# Patient Record
Sex: MALE | Race: WHITE | NOT HISPANIC OR LATINO | URBAN - METROPOLITAN AREA
[De-identification: names, ages, dates, MRNs, and addresses within clinical notes are randomized per-mention and may not be internally consistent; named-entity substitution may affect disease eponyms.]

---

## 2018-04-11 ENCOUNTER — OFFICE VISIT - HEALTHEAST (OUTPATIENT)
Dept: FAMILY MEDICINE | Facility: CLINIC | Age: 43
End: 2018-04-11

## 2018-04-11 DIAGNOSIS — E78.5 HYPERLIPIDEMIA: ICD-10-CM

## 2018-04-11 DIAGNOSIS — F41.9 ANXIETY: ICD-10-CM

## 2018-04-11 DIAGNOSIS — Z13.1 SCREENING FOR DIABETES MELLITUS: ICD-10-CM

## 2018-04-11 DIAGNOSIS — R06.83 SNORING: ICD-10-CM

## 2018-04-11 DIAGNOSIS — Z86.2 HISTORY OF ANEMIA: ICD-10-CM

## 2018-04-11 DIAGNOSIS — F17.200 TOBACCO USE DISORDER: ICD-10-CM

## 2018-04-11 DIAGNOSIS — F41.1 ANXIETY STATE: ICD-10-CM

## 2018-04-11 LAB
ALBUMIN SERPL-MCNC: 4 G/DL (ref 3.5–5)
ALP SERPL-CCNC: 63 U/L (ref 45–120)
ALT SERPL W P-5'-P-CCNC: 14 U/L (ref 0–45)
ANION GAP SERPL CALCULATED.3IONS-SCNC: 9 MMOL/L (ref 5–18)
AST SERPL W P-5'-P-CCNC: 14 U/L (ref 0–40)
BASOPHILS # BLD AUTO: 0 THOU/UL (ref 0–0.2)
BASOPHILS NFR BLD AUTO: 1 % (ref 0–2)
BILIRUB SERPL-MCNC: 0.8 MG/DL (ref 0–1)
BUN SERPL-MCNC: 13 MG/DL (ref 8–22)
CALCIUM SERPL-MCNC: 9.9 MG/DL (ref 8.5–10.5)
CHLORIDE BLD-SCNC: 98 MMOL/L (ref 98–107)
CHOLEST SERPL-MCNC: 241 MG/DL
CO2 SERPL-SCNC: 29 MMOL/L (ref 22–31)
CREAT SERPL-MCNC: 0.98 MG/DL (ref 0.7–1.3)
EOSINOPHIL # BLD AUTO: 0.2 THOU/UL (ref 0–0.4)
EOSINOPHIL NFR BLD AUTO: 3 % (ref 0–6)
ERYTHROCYTE [DISTWIDTH] IN BLOOD BY AUTOMATED COUNT: 11.6 % (ref 11–14.5)
FASTING STATUS PATIENT QL REPORTED: NO
GFR SERPL CREATININE-BSD FRML MDRD: >60 ML/MIN/1.73M2
GLUCOSE BLD-MCNC: 89 MG/DL (ref 70–125)
HCT VFR BLD AUTO: 49.9 % (ref 40–54)
HDLC SERPL-MCNC: 52 MG/DL
HGB BLD-MCNC: 16.9 G/DL (ref 14–18)
LDLC SERPL CALC-MCNC: 150 MG/DL
LYMPHOCYTES # BLD AUTO: 1.5 THOU/UL (ref 0.8–4.4)
LYMPHOCYTES NFR BLD AUTO: 28 % (ref 20–40)
MCH RBC QN AUTO: 34.3 PG (ref 27–34)
MCHC RBC AUTO-ENTMCNC: 33.8 G/DL (ref 32–36)
MCV RBC AUTO: 101 FL (ref 80–100)
MONOCYTES # BLD AUTO: 0.3 THOU/UL (ref 0–0.9)
MONOCYTES NFR BLD AUTO: 6 % (ref 2–10)
NEUTROPHILS # BLD AUTO: 3.3 THOU/UL (ref 2–7.7)
NEUTROPHILS NFR BLD AUTO: 63 % (ref 50–70)
PLATELET # BLD AUTO: 165 THOU/UL (ref 140–440)
PMV BLD AUTO: 7.6 FL (ref 7–10)
POTASSIUM BLD-SCNC: 4.4 MMOL/L (ref 3.5–5)
PROT SERPL-MCNC: 7.3 G/DL (ref 6–8)
RBC # BLD AUTO: 4.93 MILL/UL (ref 4.4–6.2)
SODIUM SERPL-SCNC: 136 MMOL/L (ref 136–145)
TRIGL SERPL-MCNC: 196 MG/DL
WBC: 5.3 THOU/UL (ref 4–11)

## 2018-04-12 ENCOUNTER — OFFICE VISIT - HEALTHEAST (OUTPATIENT)
Dept: SLEEP MEDICINE | Facility: CLINIC | Age: 43
End: 2018-04-12

## 2018-04-12 DIAGNOSIS — R29.818 SUSPECTED SLEEP APNEA: ICD-10-CM

## 2018-04-12 DIAGNOSIS — R06.83 SNORING: ICD-10-CM

## 2018-04-12 DIAGNOSIS — G47.10 HYPERSOMNIA: ICD-10-CM

## 2018-04-12 DIAGNOSIS — G47.8 SLEEP DYSFUNCTION WITH SLEEP STAGE DISTURBANCE: ICD-10-CM

## 2018-04-12 ASSESSMENT — MIFFLIN-ST. JEOR: SCORE: 1520.84

## 2018-04-13 ENCOUNTER — COMMUNICATION - HEALTHEAST (OUTPATIENT)
Dept: FAMILY MEDICINE | Facility: CLINIC | Age: 43
End: 2018-04-13

## 2018-05-15 ENCOUNTER — RECORDS - HEALTHEAST (OUTPATIENT)
Dept: SLEEP MEDICINE | Facility: CLINIC | Age: 43
End: 2018-05-15

## 2018-05-15 ENCOUNTER — RECORDS - HEALTHEAST (OUTPATIENT)
Dept: ADMINISTRATIVE | Facility: OTHER | Age: 43
End: 2018-05-15

## 2018-05-15 DIAGNOSIS — R06.83 SNORING: ICD-10-CM

## 2018-05-15 DIAGNOSIS — G47.8 OTHER SLEEP DISORDERS: ICD-10-CM

## 2018-05-15 DIAGNOSIS — G47.10 HYPERSOMNIA, UNSPECIFIED: ICD-10-CM

## 2018-05-15 DIAGNOSIS — G47.30 SLEEP APNEA, UNSPECIFIED: ICD-10-CM

## 2018-05-17 ENCOUNTER — COMMUNICATION - HEALTHEAST (OUTPATIENT)
Dept: SLEEP MEDICINE | Facility: CLINIC | Age: 43
End: 2018-05-17

## 2018-05-17 DIAGNOSIS — G47.33 OBSTRUCTIVE SLEEP APNEA: ICD-10-CM

## 2018-05-18 ENCOUNTER — AMBULATORY - HEALTHEAST (OUTPATIENT)
Dept: SLEEP MEDICINE | Facility: CLINIC | Age: 43
End: 2018-05-18

## 2018-05-21 ENCOUNTER — COMMUNICATION - HEALTHEAST (OUTPATIENT)
Dept: SLEEP MEDICINE | Facility: CLINIC | Age: 43
End: 2018-05-21

## 2018-06-01 ENCOUNTER — AMBULATORY - HEALTHEAST (OUTPATIENT)
Dept: SLEEP MEDICINE | Facility: CLINIC | Age: 43
End: 2018-06-01

## 2018-06-25 ENCOUNTER — COMMUNICATION - HEALTHEAST (OUTPATIENT)
Dept: SCHEDULING | Facility: CLINIC | Age: 43
End: 2018-06-25

## 2018-06-26 ENCOUNTER — OFFICE VISIT - HEALTHEAST (OUTPATIENT)
Dept: FAMILY MEDICINE | Facility: CLINIC | Age: 43
End: 2018-06-26

## 2018-06-26 DIAGNOSIS — L03.90 CELLULITIS: ICD-10-CM

## 2018-06-26 DIAGNOSIS — T63.461A WASP STING: ICD-10-CM

## 2018-06-26 ASSESSMENT — MIFFLIN-ST. JEOR: SCORE: 1514.49

## 2019-12-05 ENCOUNTER — RECORDS - HEALTHEAST (OUTPATIENT)
Dept: ADMINISTRATIVE | Facility: OTHER | Age: 44
End: 2019-12-05

## 2019-12-06 ENCOUNTER — OFFICE VISIT - HEALTHEAST (OUTPATIENT)
Dept: FAMILY MEDICINE | Facility: CLINIC | Age: 44
End: 2019-12-06

## 2019-12-06 DIAGNOSIS — Z23 NEED FOR HEPATITIS A VACCINATION: ICD-10-CM

## 2019-12-06 DIAGNOSIS — R10.9 RIGHT FLANK PAIN: ICD-10-CM

## 2019-12-06 DIAGNOSIS — R35.0 URINE FREQUENCY: ICD-10-CM

## 2019-12-06 DIAGNOSIS — F41.9 ANXIETY: ICD-10-CM

## 2019-12-06 LAB
ALBUMIN UR-MCNC: NEGATIVE MG/DL
APPEARANCE UR: CLEAR
BILIRUB UR QL STRIP: NEGATIVE
COLOR UR AUTO: YELLOW
GLUCOSE UR STRIP-MCNC: NEGATIVE MG/DL
HGB UR QL STRIP: NEGATIVE
KETONES UR STRIP-MCNC: NEGATIVE MG/DL
LEUKOCYTE ESTERASE UR QL STRIP: NEGATIVE
NITRATE UR QL: NEGATIVE
PH UR STRIP: 6 [PH] (ref 5–8)
SP GR UR STRIP: 1.03 (ref 1–1.03)
UROBILINOGEN UR STRIP-ACNC: NORMAL

## 2019-12-06 ASSESSMENT — ANXIETY QUESTIONNAIRES
6. BECOMING EASILY ANNOYED OR IRRITABLE: NOT AT ALL
1. FEELING NERVOUS, ANXIOUS, OR ON EDGE: NEARLY EVERY DAY
IF YOU CHECKED OFF ANY PROBLEMS ON THIS QUESTIONNAIRE, HOW DIFFICULT HAVE THESE PROBLEMS MADE IT FOR YOU TO DO YOUR WORK, TAKE CARE OF THINGS AT HOME, OR GET ALONG WITH OTHER PEOPLE: SOMEWHAT DIFFICULT
GAD7 TOTAL SCORE: 13
2. NOT BEING ABLE TO STOP OR CONTROL WORRYING: NEARLY EVERY DAY
5. BEING SO RESTLESS THAT IT IS HARD TO SIT STILL: SEVERAL DAYS
7. FEELING AFRAID AS IF SOMETHING AWFUL MIGHT HAPPEN: SEVERAL DAYS
4. TROUBLE RELAXING: MORE THAN HALF THE DAYS
3. WORRYING TOO MUCH ABOUT DIFFERENT THINGS: NEARLY EVERY DAY

## 2019-12-06 ASSESSMENT — PATIENT HEALTH QUESTIONNAIRE - PHQ9: SUM OF ALL RESPONSES TO PHQ QUESTIONS 1-9: 5

## 2019-12-06 ASSESSMENT — MIFFLIN-ST. JEOR: SCORE: 1537.74

## 2019-12-07 LAB — BACTERIA SPEC CULT: NO GROWTH

## 2019-12-10 ENCOUNTER — COMMUNICATION - HEALTHEAST (OUTPATIENT)
Dept: FAMILY MEDICINE | Facility: CLINIC | Age: 44
End: 2019-12-10

## 2020-06-18 ENCOUNTER — COMMUNICATION - HEALTHEAST (OUTPATIENT)
Dept: FAMILY MEDICINE | Facility: CLINIC | Age: 45
End: 2020-06-18

## 2020-06-18 DIAGNOSIS — F41.9 ANXIETY: ICD-10-CM

## 2020-06-19 ENCOUNTER — OFFICE VISIT - HEALTHEAST (OUTPATIENT)
Dept: FAMILY MEDICINE | Facility: CLINIC | Age: 45
End: 2020-06-19

## 2020-06-19 DIAGNOSIS — F41.9 ANXIETY: ICD-10-CM

## 2020-06-19 DIAGNOSIS — F17.200 TOBACCO USE DISORDER: ICD-10-CM

## 2020-06-19 RX ORDER — BUPROPION HYDROCHLORIDE 150 MG/1
150 TABLET ORAL DAILY
Qty: 90 TABLET | Refills: 0 | Status: SHIPPED | OUTPATIENT
Start: 2020-06-19

## 2020-06-19 ASSESSMENT — ANXIETY QUESTIONNAIRES
3. WORRYING TOO MUCH ABOUT DIFFERENT THINGS: MORE THAN HALF THE DAYS
7. FEELING AFRAID AS IF SOMETHING AWFUL MIGHT HAPPEN: SEVERAL DAYS
GAD7 TOTAL SCORE: 10
4. TROUBLE RELAXING: MORE THAN HALF THE DAYS
IF YOU CHECKED OFF ANY PROBLEMS ON THIS QUESTIONNAIRE, HOW DIFFICULT HAVE THESE PROBLEMS MADE IT FOR YOU TO DO YOUR WORK, TAKE CARE OF THINGS AT HOME, OR GET ALONG WITH OTHER PEOPLE: NOT DIFFICULT AT ALL
5. BEING SO RESTLESS THAT IT IS HARD TO SIT STILL: NOT AT ALL
2. NOT BEING ABLE TO STOP OR CONTROL WORRYING: MORE THAN HALF THE DAYS
6. BECOMING EASILY ANNOYED OR IRRITABLE: MORE THAN HALF THE DAYS
1. FEELING NERVOUS, ANXIOUS, OR ON EDGE: SEVERAL DAYS

## 2020-06-19 ASSESSMENT — PATIENT HEALTH QUESTIONNAIRE - PHQ9: SUM OF ALL RESPONSES TO PHQ QUESTIONS 1-9: 6

## 2021-01-22 ENCOUNTER — COMMUNICATION - HEALTHEAST (OUTPATIENT)
Dept: FAMILY MEDICINE | Facility: CLINIC | Age: 46
End: 2021-01-22

## 2021-01-22 DIAGNOSIS — F41.9 ANXIETY: ICD-10-CM

## 2021-05-26 ASSESSMENT — PATIENT HEALTH QUESTIONNAIRE - PHQ9: SUM OF ALL RESPONSES TO PHQ QUESTIONS 1-9: 5

## 2021-05-27 ASSESSMENT — PATIENT HEALTH QUESTIONNAIRE - PHQ9: SUM OF ALL RESPONSES TO PHQ QUESTIONS 1-9: 6

## 2021-05-28 ENCOUNTER — RECORDS - HEALTHEAST (OUTPATIENT)
Dept: ADMINISTRATIVE | Facility: CLINIC | Age: 46
End: 2021-05-28

## 2021-05-28 ASSESSMENT — ANXIETY QUESTIONNAIRES
GAD7 TOTAL SCORE: 10
GAD7 TOTAL SCORE: 13

## 2021-06-01 VITALS — HEIGHT: 67 IN | WEIGHT: 150.4 LBS | BODY MASS INDEX: 23.61 KG/M2

## 2021-06-01 VITALS — BODY MASS INDEX: 23.39 KG/M2 | WEIGHT: 149 LBS | HEIGHT: 67 IN

## 2021-06-01 VITALS — BODY MASS INDEX: 23.36 KG/M2 | WEIGHT: 149.13 LBS

## 2021-06-02 ENCOUNTER — RECORDS - HEALTHEAST (OUTPATIENT)
Dept: ADMINISTRATIVE | Facility: CLINIC | Age: 46
End: 2021-06-02

## 2021-06-04 VITALS
HEIGHT: 67 IN | WEIGHT: 155 LBS | OXYGEN SATURATION: 99 % | HEART RATE: 94 BPM | BODY MASS INDEX: 24.33 KG/M2 | DIASTOLIC BLOOD PRESSURE: 86 MMHG | SYSTOLIC BLOOD PRESSURE: 130 MMHG

## 2021-06-04 NOTE — PROGRESS NOTES
Chart reviewed.  Pt is scheduled for a care manager telephone visit today 10/4/17 at 9:30 AM, however is currently admitted to [facility].  Placed phone call to Shruthi in scheduling to request that care manager telephone visit and discharge clinic appt be canceled and/or rescheduled.   Chief Complaint   Patient presents with     Flank Pain     Right lower side. Comes and goes for a couple of years. Feels slight pain everyday. Pain starting to be a little more stronger.      Urinary Frequency     For the last couple of days.      Bladder Pressure     Travel Consult     Will be leaving to [facility] in January.        HPI: Patient presents today with multiple concerns.    First for the last year he has noticed a consistent feeling of something sight and uncomfortable along his right flank when he pushes into it. Previous assessment has not turned up anything abnormal. CT scan previously performed for pelvic pains years ago was normal as well.    Patient complains as well about urinary frequency where he feels like he needs to urinate every 2 hours. There's maybe a small amount of pain with it, but not sure. No blood. No discharge. No STD risk. Afebrile. No hx of stone.     Patient complains of anxiety. In the past he had used wellbutrin for this, but never felt like it helped a ton. No SI or HI. Sleep is adequate. No large amounts of caffeine.    Patient is going to [facility] in January. He was evaluated at a travel clinic last year and reports getting his first hep a, but not his second. He also received a typhoid vaccine as well last year.    He continues to smoke about a 1/2 ppd.    CHERYL 7 Total Score: 13 (12/6/2019  4:00 PM)  PHQ-9 Total Score: 5 (12/6/2019  4:00 PM)    ROS:Review of Systems - negative except for what's listed in the HPI    SH: The Patient's  reports that he has been smoking cigarettes. He has been smoking about 0.50 packs per day. He has never used smokeless tobacco. He reports current alcohol use of about 8.0 standard drinks of alcohol per week. He reports that he does not use drugs.      FH: The Patient's family history includes COPD in his mother; Depression in his mother; Heart attack in his father; No Medical Problems in his daughter; Snoring in his father.     Meds:    Current  "Outpatient Medications on File Prior to Visit   Medication Sig Dispense Refill     buPROPion (WELLBUTRIN XL) 150 MG 24 hr tablet Take 1 tablet (150 mg total) by mouth every morning. 30 tablet 11     No current facility-administered medications on file prior to visit.        O:  /86   Pulse 94   Ht 5' 6.75\" (1.695 m)   Wt 155 lb (70.3 kg)   SpO2 99%   BMI 24.46 kg/m      Physical Examination:   General appearance - alert, well appearing, and in no distress  Mental status - alert, oriented to person, place, and time. Appropriately dress. Speech at times mildly pressured. Thought process sometimes scattered. Appears very anxious about this sensation in his flank.  Mouth - mucous membranes moist, pharynx normal  Neck - no significant adenopathy  Lymphatics - no palpable lymphadenopathy, no hepatosplenomegaly  Chest - clear to auscultation, no wheezes, rales or rhonchi, symmetric air entry  Heart - normal rate and regular rhythm, S1 and S2 normal, no murmurs noted  Abdomen - soft, nondistended, no masses or organomegaly  Skin - normal coloration and turgor.      A/P:     Problem List Items Addressed This Visit     None      Visit Diagnoses     Urine frequency    -  Primary    Relevant Orders    Urinalysis Macroscopic (Completed)    Culture, Urine (Completed)    Right flank pain        Relevant Orders    CT Abdomen Pelvis With Oral With IV Contrast    Anxiety        Relevant Medications    sertraline (ZOLOFT) 50 MG tablet    Need for hepatitis A vaccination        Relevant Orders    Hepatitis A adult 2 dose IM (19 yr & older) (Completed)            1. Urine frequency  Urinalysis normal. Patient admits later that his symptoms are a little better today than yesterday. Culture to confirm. Monitor    - Urinalysis Macroscopic  - Culture, Urine    2. Right flank pain  Unknown cause. No abnormality palpated. Previous labs have been fine. Possible tugging on the fascia vs bowel spasm. R/o stone w/ CT    - CT Abdomen " Pelvis With Oral With IV Contrast; Future    3. Anxiety  Uncontrolled. Recommended counseling and/or medication. He would like to try medication. Discussed how wellbutrin can at times exacerbate anxiety and recommended SSRI which he was agreeable to.     - sertraline (ZOLOFT) 50 MG tablet; 1/2 tablet daily for a week then a full tab daily.  Dispense: 30 tablet; Refill: 2    4. Need for hepatitis A vaccination  No records of second hep a from [facility]. Last dose given today. Should be ok on typhoid if he received it last year.  - Hepatitis A adult 2 dose IM (19 yr & older)        Leodan Hyde, CNP

## 2021-06-09 NOTE — TELEPHONE ENCOUNTER
Sertraline.    Left message to call back for: patient  Information to relay to patient:  See message below and help schedule if appropriate

## 2021-06-09 NOTE — TELEPHONE ENCOUNTER
Last Med Check: 12/6/19.    Next med check due on: ?    Future Appointment Scheduled ? No.     Last Med Refill? 12/6/9.    Elise oL, CMA

## 2021-06-09 NOTE — PROGRESS NOTES
"Lalitha Parmar is a 44 y.o. male who is being evaluated via a billable telephone visit.      The patient has been notified of following:     \"This telephone visit will be conducted via a call between you and your physician/provider. We have found that certain health care needs can be provided without the need for a physical exam.  This service lets us provide the care you need with a short phone conversation.  If a prescription is necessary we can send it directly to your pharmacy.  If lab work is needed we can place an order for that and you can then stop by our lab to have the test done at a later time.    Telephone visits are billed at different rates depending on your insurance coverage. During this emergency period, for some insurers they may be billed the same as an in-person visit.  Please reach out to your insurance provider with any questions.    If during the course of the call the physician/provider feels a telephone visit is not appropriate, you will not be charged for this service.\"    Patient has given verbal consent to a Telephone visit? Yes    What phone number would you like to be contacted at? [phone].    Patient would like to receive their AVS by Declined.     Additional provider notes: Patient presents today for medication check on sertraline which we started back in December 2019 for worsening anxiety symptoms.  No major side effects.  The patient admits that he does not take the medication every day but rather will start and stop it after taking it for a few weeks at a time.  Unfortunately anxiety symptoms come back when he stops the medication.    CHERYL 7 Total Score: 10 (6/19/2020  4:00 PM)  PHQ-9 Total Score: 6 (6/19/2020  4:00 PM)    Unfortunately he continues to smoke.  In the past he found that Wellbutrin was beneficial for curbing his smoking desire.  He thinks he is getting at a point where he is ready to quit.    Assessment/Plan:    Refill of sertraline sent to the pharmacy.  " Advised the patient that this medication would probably be best taken every day so that he is not on a roller coaster of anxiety symptoms.  Okay to restart bupropion with this medication.  Discussed that there is a potential for increased anxiety symptoms.  Alternatives include nicotine replacement and Chantix.  If everything is going well, check back with me in 6 months for physical.    1. Anxiety  - sertraline (ZOLOFT) 50 MG tablet; Take 1 tablet (50 mg total) by mouth daily.  Dispense: 90 tablet; Refill: 1  - buPROPion (WELLBUTRIN XL) 150 MG 24 hr tablet; Take 1 tablet (150 mg total) by mouth daily.  Dispense: 90 tablet; Refill: 0    2. Nicotine Dependence  - buPROPion (WELLBUTRIN XL) 150 MG 24 hr tablet; Take 1 tablet (150 mg total) by mouth daily.  Dispense: 90 tablet; Refill: 0        Phone call duration:  13 minutes

## 2021-06-09 NOTE — TELEPHONE ENCOUNTER
Patient Returning Call  Reason for call:  Patient called back.  Information relayed to patient:  Informed of message listed below from Leodan Hyde CNP.  Patient states understanding and agrees with plan.  Patient has additional questions:  No  If YES, what are your questions/concerns:    Okay to leave a detailed message?: No call back needed

## 2021-06-14 NOTE — TELEPHONE ENCOUNTER
Refill Approved    Rx renewed per Medication Renewal Policy. Medication was last renewed on 6/19/20, last OV 6/19/20.    Cata Dennis, [facility] Triage/Med Refill 1/24/2021     Requested Prescriptions   Pending Prescriptions Disp Refills     sertraline (ZOLOFT) 50 MG tablet [Pharmacy Med Name: SERTRALINE 50MG TABLETS] 90 tablet 1     Sig: TAKE 1 TABLET BY MOUTH DAILY       SSRI Refill Protocol  Passed - 1/22/2021  5:16 PM        Passed - PCP or prescribing provider visit in last year     Last office visit with prescriber/PCP: 12/6/2019 Leodan Hyde CNP OR same dept: Visit date not found OR same specialty: 12/6/2019 Leodan Hyde CNP  Last physical: Visit date not found Last MTM visit: Visit date not found   Next visit within 3 mo: Visit date not found  Next physical within 3 mo: Visit date not found  Prescriber OR PCP: Leodan Hyde CNP  Last diagnosis associated with med order: 1. Anxiety  - sertraline (ZOLOFT) 50 MG tablet [Pharmacy Med Name: SERTRALINE 50MG TABLETS]; TAKE 1 TABLET BY MOUTH DAILY  Dispense: 90 tablet; Refill: 1    If protocol passes may refill for 12 months if within 3 months of last provider visit (or a total of 15 months).

## 2021-06-17 NOTE — PATIENT INSTRUCTIONS - HE
Patient Instructions by Leodan Hyde CNP at 12/6/2019  3:00 PM     Author: Leodan Hyde CNP Service: -- Author Type: Nurse Practitioner    Filed: 12/6/2019  3:32 PM Encounter Date: 12/6/2019 Status: Addendum    : Leodan Hyde CNP (Nurse Practitioner)    Related Notes: Original Note by Leodan Hyde CNP (Nurse Practitioner) filed at 12/6/2019  3:31 PM       Urine so far looks clean. We're going to culture to confirm.     Let's try something for anxiety. I sent the sertraline to the pharmacy. 1/2 tab daily for a week then a full tab daily.    I ordered the CT IF the pain doesn't get better by the end of the month. If the pain goes away, you don't have to get this done.    I usually comment on labs and imaging after they are all resulted within 2 business days. If you haven't heard your results within a week, please contact the office.    Thank you for coming in today!    If you receive a survey from [employer] about your experience today, it would be very helpful if you could fill it out to let us know what went well and what we can improve!    General Information:    Today you had your appointment with Leodan Hyde NP    My hours are:    Monday : Out of clinic  Tuesday : 8:00AM - 5:00 PM  Wednesday: 8:00AM - 5:00 PM  Thursday: 8:00AM - 5:00 PM  Friday: 8:00AM - 5:00 PM    I am not in the office Mondays. Therefore non-urgent calls and medical messages received on Monday will be addressed when I am back in the office on Tuesday. Urgent matters will be reviewed and addressed by one of my partners in the office as needed.    If lab work was done today as part of your evaluation you will generally be contacted via [employer], mail, or phone with the results within 1-5 days. If there is an alarming result we will contact you by phone. Lab results come back at varying times, I generally wait until all lab results are available before making comments on the results.     If you need refills please contact  your pharmacy. They will send a refill request to me to review. Please allow 3-5 business days for us to process all refill requests.     My Clinical Assistant is Elise. Please call us at [phone] or send a medical message with any questions or concerns.         Patient Education     Sertraline tablets  Brand Name: Zoloft  What is this medicine?  SERTRALINE (SER tra prisca) is used to treat depression. It may also be used to treat obsessive compulsive disorder, panic disorder, post-trauma stress, premenstrual dysphoric disorder (PMDD) or social anxiety.  How should I use this medicine?  Take this medicine by mouth with a glass of water. Follow the directions on the prescription label. You can take it with or without food. Take your medicine at regular intervals. Do not take your medicine more often than directed. Do not stop taking this medicine suddenly except upon the advice of your doctor. Stopping this medicine too quickly may cause serious side effects or your condition may worsen.  A special MedGuide will be given to you by the pharmacist with each prescription and refill. Be sure to read this information carefully each time.  Talk to your pediatrician regarding the use of this medicine in children. While this drug may be prescribed for children as young as 7 years for selected conditions, precautions do apply.  What side effects may I notice from receiving this medicine?  Side effects that you should report to your doctor or health care professional as soon as possible:    allergic reactions like skin rash, itching or hives, swelling of the face, lips, or tongue    anxious    black, tarry stools    changes in vision    confusion    elevated mood, decreased need for sleep, racing thoughts, impulsive behavior    eye pain    fast, irregular heartbeat    feeling faint or lightheaded, falls    feeling agitated, angry, or irritable    hallucination, loss of contact with reality    loss of balance or  coordination    loss of memory    painful or prolonged erections    restlessness, pacing, inability to keep still    seizures    stiff muscles    suicidal thoughts or other mood changes    trouble sleeping    unusual bleeding or bruising    unusually weak or tired    vomiting  Side effects that usually do not require medical attention (report to your doctor or health care professional if they continue or are bothersome):    change in appetite or weight    change in sex drive or performance    diarrhea    increased sweating    indigestion, nausea    tremors  What may interact with this medicine?  Do not take this medicine with any of the following medications:    certain medicines for fungal infections like fluconazole, itraconazole, ketoconazole, posaconazole, voriconazole    cisapride    disulfiram    dofetilide    linezolid    MAOIs like Carbex, Eldepryl, Marplan, Nardil, and Parnate    metronidazole    methylene blue (injected into a vein)    pimozide    thioridazine    ziprasidone  This medicine may also interact with the following medications:    alcohol    amphetamines    aspirin and aspirin-like medicines    certain medicines for depression, anxiety, or psychotic disturbances    certain medicines for irregular heart beat like flecainide, propafenone    certain medicines for migraine headaches like almotriptan, eletriptan, frovatriptan, naratriptan, rizatriptan, sumatriptan, zolmitriptan    certain medicines for sleep    certain medicines for seizures like carbamazepine, valproic acid, phenytoin    certain medicines that treat or prevent blood clots like warfarin, enoxaparin, dalteparin    cimetidine    digoxin    diuretics    fentanyl    furazolidone    isoniazid    lithium    NSAIDs, medicines for pain and inflammation, like ibuprofen or naproxen    other medicines that prolong the QT interval (cause an abnormal heart rhythm)    procarbazine    rasagiline    supplements like Becky's wort, kava kava,  valerian    tolbutamide    tramadol    tryptophan  What if I miss a dose?  If you miss a dose, take it as soon as you can. If it is almost time for your next dose, take only that dose. Do not take double or extra doses.  Where should I keep my medicine?  Keep out of the reach of children.  Store at room temperature between 15 and 30 degrees C (59 and 86 degrees F). Throw away any unused medicine after the expiration date.  What should I tell my health care provider before I take this medicine?  They need to know if you have any of these conditions:    bleeding disorders    bipolar disorder or a family history of bipolar disorder    glaucoma    heart disease    high blood pressure    history of irregular heartbeat    history of low levels of calcium, magnesium, or potassium in the blood    if you often drink alcohol    liver disease    receiving electroconvulsive therapy    seizures    suicidal thoughts, plans, or attempt; a previous suicide attempt by you or a family member    take medicines that treat or prevent blood clots    thyroid disease    an unusual or allergic reaction to sertraline, other medicines, foods, dyes, or preservatives    pregnant or trying to get pregnant    breast-feeding  What should I watch for while using this medicine?  Tell your doctor if your symptoms do not get better or if they get worse. Visit your doctor or health care professional for regular checks on your progress. Because it may take several weeks to see the full effects of this medicine, it is important to continue your treatment as prescribed by your doctor.  Patients and their families should watch out for new or worsening thoughts of suicide or depression. Also watch out for sudden changes in feelings such as feeling anxious, agitated, panicky, irritable, hostile, aggressive, impulsive, severely restless, overly excited and hyperactive, or not being able to sleep. If this happens, especially at the beginning of treatment or  after a change in dose, call your health care professional.  You may get drowsy or dizzy. Do not drive, use machinery, or do anything that needs mental alertness until you know how this medicine affects you. Do not stand or sit up quickly, especially if you are an older patient. This reduces the risk of dizzy or fainting spells. Alcohol may interfere with the effect of this medicine. Avoid alcoholic drinks.  Your mouth may get dry. Chewing sugarless gum or sucking hard candy, and drinking plenty of water may help. Contact your doctor if the problem does not go away or is severe.  NOTE:This sheet is a summary. It may not cover all possible information. If you have questions about this medicine, talk to your doctor, pharmacist, or health care provider. Copyright  2018 [employer]
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
## 2021-06-18 NOTE — PROGRESS NOTES
Assessment/ Plan     1. Cellulitis  I believe patient has developed a cellulitis after his wasp sting. He has redness streaking up his right arm.  I did jona this with sharpie marker.  We will start him on Keflex and have him follow-up if the redness continues to spread.  Would also have him take a Zyrtec during the day and Benadryl at night for the local reaction.  - cephalexin (KEFLEX) 500 MG capsule; Take 1 capsule (500 mg total) by mouth 3 (three) times a day for 10 days.  Dispense: 30 capsule; Refill: 0    2. Wasp sting        Subjective:       Lalitha Parmar is a 42 y.o. male who presents for evaluation after a wasp sting.  Patient states yesterday he was at work when he got stung on his right forearm.  He has pictures that he shows me on his phone.  It was a typical local reaction with some redness and swelling there is a little bit of we will over the top of it.  He states then last night it became more red and swollen.  He had hives across his groin area.  He did take a Benadryl and then decided to come in for check.  He is not having a fever feeling sick.  He had a wasp sting last year and just had a little bit of local swelling.  He denies any lip or tongue swelling, no wheezing.  No allergies to anything else.    Relevant past medical, family, surgical, and social history reviewed with patient, unless noted in HPI, not pertinent for this visit.    Review of Systems   A 12 point comprehensive review of systems was negative except as noted.      Current Outpatient Prescriptions   Medication Sig Dispense Refill     buPROPion (WELLBUTRIN XL) 150 MG 24 hr tablet Take 1 tablet (150 mg total) by mouth every morning. 30 tablet 11     cephalexin (KEFLEX) 500 MG capsule Take 1 capsule (500 mg total) by mouth 3 (three) times a day for 10 days. 30 capsule 0     No current facility-administered medications for this visit.        Objective:      /68  Pulse 72  Temp 99.7  F (37.6  C) (Oral)   Resp 18  Ht  "5' 7\" (1.702 m)  Wt 149 lb (67.6 kg)  BMI 23.34 kg/m2      General appearance: alert, appears stated age and cooperative  Head: Normocephalic, without obvious abnormality, atraumatic  Eyes: conjunctivae/corneas clear.   Ears: normal TM's and external ear canals both ears  Nose: Nares normal. Septum midline. Mucosa normal. No drainage or sinus tenderness.  Throat: lips, mucosa, and tongue normal; teeth and gums normal  Neck: no adenopathy.  Lungs: clear to auscultation bilaterally  Heart: regular rate and rhythm, S1, S2 normal, no murmur, click, rub or gallop  Abdomen: soft, non-tender; bowel sounds normal; no masses,  no organomegaly    Skin: Right forearm has a small scab where he had a wasp sting.  There is significant surrounding erythema, edema, and warmth.  He does have streaking up the medial aspect of his right arm.  I did outline this with a sharpie marker.    No results found for this or any previous visit (from the past 168 hour(s)).       This note has been dictated using voice recognition software. Any grammatical or context distortions are unintentional and inherent to the software  "

## 2021-06-18 NOTE — PROGRESS NOTES
Order for Durable Medical Equipment was processed and equipment ordered.   DME provider: [facility]  Date Faxed: 05/18/2018  Ordering Provider: Dr. Magaña  Equipment ordered: Cpap

## 2021-06-18 NOTE — PROGRESS NOTES
Patient was offered choice of vendor and chose [facility].  Patient Lalitha Parmar was set up at [facility] on 6/1/18. Patient received a Resmed [id number] Auto. Pressures were set at 5-15 CM H2O.   Patient s ramp is 5 cm H2O for off and FLEX/EPR is EPR.  Patient received a [employer] Mask name: Dreamwear  FFM Size small, heated tubing and heated humidifier.    Pacee Her

## 2021-06-20 NOTE — LETTER
Letter by Leodan Hyde CNP at      Author: Leodan Hyde CNP Service: -- Author Type: --    Filed:  Encounter Date: 12/10/2019 Status: Signed         Lalitha Parmar  [address] [place] [address]             December 10, 2019         Dear Mr. Parmar,    Below are the results from your recent visit:    Resulted Orders   Urinalysis Macroscopic   Result Value Ref Range    Color, UA Yellow Colorless, Yellow, Straw, Light Yellow    Clarity, UA Clear Clear    Glucose, UA Negative Negative    Bilirubin, UA Negative Negative    Ketones, UA Negative Negative    Specific Gravity, UA 1.030 1.005 - 1.030    Blood, UA Negative Negative    pH, UA 6.0 5.0 - 8.0    Protein, UA Negative Negative mg/dL    Urobilinogen, UA 0.2 E.U./dL 0.2 E.U./dL, 1.0 E.U./dL    Nitrite, UA Negative Negative    Leukocytes, UA Negative Negative   Culture, Urine   Result Value Ref Range    Culture No Growth      Urine quick analysis and culture are totally normal!  Please call with questions or contact us using [employer].    Sincerely,         Leodan Hyde CNP

## 2021-07-15 DIAGNOSIS — F41.9 ANXIETY: ICD-10-CM

## 2021-07-20 NOTE — TELEPHONE ENCOUNTER
The patient needs a preventive health care visit with either Leodan or myself.  Try to get that scheduled for the next 2 to 3 months.  I renewed his medication for just 3 months.    Dr. Vital

## 2021-07-20 NOTE — TELEPHONE ENCOUNTER
"Routing refill request to provider for review/approval because:  Patient needs to be seen because it has been more than 1 year since last office visit.    Last Written Prescription Date:  1/24/21  Last Fill Quantity: 90,  # refills: 1   Last office visit provider:  6/19/20     Requested Prescriptions   Pending Prescriptions Disp Refills     sertraline (ZOLOFT) 50 MG tablet [Pharmacy Med Name: SERTRALINE 50MG TABLETS] 90 tablet 1     Sig: TAKE 1 TABLET(50 MG) BY MOUTH DAILY       SSRIs Protocol Failed - 7/15/2021  6:50 PM        Failed - Recent (12 mo) or future (30 days) visit within the authorizing provider's specialty     Patient has had an office visit with the authorizing provider or a provider within the authorizing providers department within the previous 12 mos or has a future within next 30 days. See \"Patient Info\" tab in inbasket, or \"Choose Columns\" in Meds & Orders section of the refill encounter.              Passed - Medication is active on med list        Passed - Patient is age 18 or older             Niraj Allan RN 07/20/21 1:25 PM  "

## 2021-07-22 ENCOUNTER — TELEPHONE (OUTPATIENT)
Dept: FAMILY MEDICINE | Facility: CLINIC | Age: 46
End: 2021-07-22

## 2021-07-22 NOTE — TELEPHONE ENCOUNTER
Per Dr. Vital, pt is needs to schedule physical appointment with either Dr. Vital or with Leodan Hyde CNP.    Please help schedule.     Elise Lo CMA